# Patient Record
(demographics unavailable — no encounter records)

---

## 2025-01-14 NOTE — ASSESSMENT
[FreeTextEntry1] : Postnasal drip with mucus becoming infected.  Secondary to allergic rhinitis from exposure to his dog Cetirizine 10 mg daily. Augmentin , 11 mL twice daily for 14 days. Follow-up in 3 weeks.

## 2025-01-14 NOTE — PHYSICAL EXAM

## 2025-01-14 NOTE — SOCIAL HISTORY
[FreeTextEntry1] : He had a dog until approximately 1 year ago. [Humidifier] : does not use a humidifier [Dehumidifier] : does not use a dehumidifier [Cockroaches] : Patient states that there are no cockroaches in the home [Dust Mite Covers] : does not have dust mite covers [Feather Pillows] : does not have feather pillows [Feather Comforter] : does not have a feather comforter [Bedroom] : not in the bedroom [Basement] : not in the basement [Living Area] : not in the living area [Smokers in Household] : there are no smokers in the home [de-identified] : Dog was present until approximately 1 year ago.

## 2025-01-14 NOTE — HISTORY OF PRESENT ILLNESS
[de-identified] : Stephane Licona is a 10-year-old boy who was taken to the doctor by his parents due to his ongoing issue of having a persistent cough. He experiences a cough that doesn't seem to improve and is present throughout the year. His parents described the cough as dry and resembling a bark. Sometimes, Stephane mentions that it disrupts his sleep at night. They visited his primary care physician, who reported that nothing concerning was detected. Stephane occasionally has a runny nose and is often stuffy and congested. His family had a dog until about a year ago. Recently, when he visited a relative's home where there was a dog, he experienced sneezing, watery eyes, and itchiness. [None] : None

## 2025-01-14 NOTE — HISTORY OF PRESENT ILLNESS
[de-identified] : Stephane Licona is a 10-year-old boy who was taken to the doctor by his parents due to his ongoing issue of having a persistent cough. He experiences a cough that doesn't seem to improve and is present throughout the year. His parents described the cough as dry and resembling a bark. Sometimes, Stephane mentions that it disrupts his sleep at night. They visited his primary care physician, who reported that nothing concerning was detected. Stephane occasionally has a runny nose and is often stuffy and congested. His family had a dog until about a year ago. Recently, when he visited a relative's home where there was a dog, he experienced sneezing, watery eyes, and itchiness. [None] : None

## 2025-01-14 NOTE — SOCIAL HISTORY
[FreeTextEntry1] : He had a dog until approximately 1 year ago. [Humidifier] : does not use a humidifier [Dehumidifier] : does not use a dehumidifier [Cockroaches] : Patient states that there are no cockroaches in the home [Dust Mite Covers] : does not have dust mite covers [Feather Pillows] : does not have feather pillows [Feather Comforter] : does not have a feather comforter [Bedroom] : not in the bedroom [Basement] : not in the basement [Living Area] : not in the living area [Smokers in Household] : there are no smokers in the home [de-identified] : Dog was present until approximately 1 year ago.

## 2025-01-14 NOTE — PHYSICAL EXAM

## 2025-01-14 NOTE — IMPRESSION
[Pulmonary Function Test] : Pulmonary Function Test [Normal PFT] : pulmonary function test normal  [Fractional of Exhaled Nitric Oxide ___] : Fractional of Exhaled Nitric Oxide [unfilled] [Normal] : normal [FreeTextEntry1] : FEV1 is 93% of predicted. [_____] : cat ([unfilled]) [Allergy Testing Dust Mite] : dust mites [Allergy Testing Mixed Feathers] : feathers [Allergy Testing Cockroach] : cockroach [] : molds [Allergy Testing Trees] : trees [Allergy Testing Weeds] : weeds [Allergy Testing Grasses] : grasses [FreeTextEntry2] : Environmental allergy skin tests are 4+ to dog dander, 2+ to dog epithelia, 1+ to cat, negative to all others.